# Patient Record
Sex: MALE | Race: OTHER | NOT HISPANIC OR LATINO | URBAN - METROPOLITAN AREA
[De-identification: names, ages, dates, MRNs, and addresses within clinical notes are randomized per-mention and may not be internally consistent; named-entity substitution may affect disease eponyms.]

---

## 2021-07-31 ENCOUNTER — EMERGENCY (EMERGENCY)
Facility: HOSPITAL | Age: 26
LOS: 1 days | Discharge: HOME | End: 2021-07-31
Attending: EMERGENCY MEDICINE
Payer: OTHER GOVERNMENT

## 2021-07-31 VITALS
WEIGHT: 207.9 LBS | RESPIRATION RATE: 16 BRPM | TEMPERATURE: 98 F | HEIGHT: 75 IN | SYSTOLIC BLOOD PRESSURE: 123 MMHG | OXYGEN SATURATION: 98 % | HEART RATE: 88 BPM | DIASTOLIC BLOOD PRESSURE: 71 MMHG

## 2021-07-31 PROCEDURE — 99284 EMERGENCY DEPT VISIT MOD MDM: CPT | Mod: 57

## 2021-07-31 PROCEDURE — 73140 X-RAY EXAM OF FINGER(S): CPT | Mod: 26,RT,76

## 2021-07-31 PROCEDURE — 99283 EMERGENCY DEPT VISIT LOW MDM: CPT | Mod: 25

## 2021-07-31 PROCEDURE — 73140 X-RAY EXAM OF FINGER(S): CPT

## 2021-07-31 PROCEDURE — 26770 TREAT FINGER DISLOCATION: CPT | Mod: 54

## 2021-07-31 PROCEDURE — 26770 TREAT FINGER DISLOCATION: CPT | Mod: F8

## 2021-07-31 RX ORDER — ACETAMINOPHEN 500 MG
650 TABLET ORAL ONCE
Refills: 0 | Status: COMPLETED | OUTPATIENT
Start: 2021-07-31 | End: 2021-07-31

## 2021-07-31 RX ORDER — IBUPROFEN 200 MG
600 TABLET ORAL ONCE
Refills: 0 | Status: COMPLETED | OUTPATIENT
Start: 2021-07-31 | End: 2021-07-31

## 2021-07-31 RX ADMIN — Medication 600 MILLIGRAM(S): at 21:07

## 2021-07-31 RX ADMIN — Medication 650 MILLIGRAM(S): at 21:06

## 2021-07-31 NOTE — ED PROVIDER NOTE - NSFOLLOWUPINSTRUCTIONS_ED_ALL_ED_FT
Follow up with Hand Surgery Dr. Luna in 7 days, number provided above: [phone]    Return if an worsening symptoms such as worsening pain, swelling, numbness/tingling, fever, chest pain, difficulty breathing.

## 2021-07-31 NOTE — ED PROVIDER NOTE - PROGRESS NOTE DETAILS
Post reduction XRs reviewed; reduction complete, no fractures noted.  Carson splint, d/c c hand f/u.  --[facility]

## 2021-07-31 NOTE — ED PROVIDER NOTE - CLINICAL SUMMARY MEDICAL DECISION MAKING FREE TEXT BOX
Peter Gann (MD): clinical PIP dislocation. Will check xr to evaluate for fracture and reduce appropriately.

## 2021-07-31 NOTE — ED PROVIDER NOTE - PHYSICAL EXAMINATION
Peter Gann (MD):     GEN: well appearing adult m, NAD  HENT: NCAT  RESP: CTAB   NEURO:  no focal deficits   EXTREMITIES: R 4th finger swelling and deformity at the PIP. Unable to range at POP. Mild sensory deficit to PIP diffusely. Capillary refill less than 2 seconds. Digit is warm. No crepitation. Peter Gann (MD):     GEN: well appearing adult m, NAD  HENT: NCAT  RESP: No respiratory distress  NEURO:  no focal deficits   EXTREMITIES: R 4th finger swelling and deformity at the PIP. Unable to range at POP. Mild sensory deficit to PIP diffusely. Capillary refill less than 2 seconds. Digit is warm. No crepitation.

## 2021-07-31 NOTE — ED PROVIDER NOTE - OBJECTIVE STATEMENT
25y otherwise health male, R hand dominant,  presents to the ED c/o R 4th finger pain and injury 1.5 hours PTA. pt was attempting to catch a ball and jammed his R 4th finger. + deformity. Otherwise no complaints. Pt has not taken anything for pain PTA.

## 2021-07-31 NOTE — ED PROVIDER NOTE - PATIENT PORTAL LINK FT
You can access the FollowMyHealth Patient Portal offered by [facility] by registering at the following website: [email or web address]. By joining [facility]’s FollowMyHealth portal, you will also be able to view your health information using other applications (apps) compatible with our system.

## 2021-07-31 NOTE — ED PROVIDER NOTE - NS_ ATTENDINGSCRIBEDETAILS _ED_A_ED_FT
Azeem FLORES: The scribe's documentation has been prepared under my direction and personally reviewed by me in its entirety. I confirm that the note above accurately reflects all work, treatment, procedures, and medical decision making performed by me (Dr. Gann).

## 2021-07-31 NOTE — ED ADULT NURSE NOTE - OBJECTIVE STATEMENT
25yM no PMHx or daily meds c/c R 4th digit injury/pain. ~1.5hrs ago, pt was catching a ball when it landed on his R hand fourth finger which hyperextended. Swelling and deformity noted to 2nd knuckle. Mild decreased sensation distal to swelling, no numbness/tingling reported. Pt reports 3/10 pain at this time. +radial pulses in UE BL. Pt unable to bend knuckle at this time. Pt aware of plan to await xray.

## 2021-07-31 NOTE — ED PROVIDER NOTE - CARE PROVIDER_API CALL
Pau Luna)  Plastic Surgery; Surgery  [address], [address]  [place] [address]  Phone: [phone]  Fax: [phone]  Follow Up Time:

## 2021-07-31 NOTE — ED PROCEDURE NOTE - PROCEDURE ADDITIONAL DETAILS
R 4th PIP reduced c traction/counter traction and distraction.  Joint reduced easily, no complications, pt tolerated well.  Improved sensation to finger post reduction.  CR <2 sec.  Rpt XRs performed.  --[facility]

## 2021-09-07 ENCOUNTER — EMERGENCY (EMERGENCY)
Facility: HOSPITAL | Age: 26
LOS: 0 days | Discharge: HOME | End: 2021-09-07
Payer: OTHER GOVERNMENT

## 2021-09-07 VITALS
HEIGHT: 75 IN | HEART RATE: 71 BPM | TEMPERATURE: 98 F | OXYGEN SATURATION: 99 % | DIASTOLIC BLOOD PRESSURE: 83 MMHG | WEIGHT: 207.9 LBS | RESPIRATION RATE: 19 BRPM | SYSTOLIC BLOOD PRESSURE: 132 MMHG

## 2021-09-07 DIAGNOSIS — S60.949A UNSPECIFIED SUPERFICIAL INJURY OF UNSPECIFIED FINGER, INITIAL ENCOUNTER: ICD-10-CM
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
## 2021-09-07 PROCEDURE — 99282 EMERGENCY DEPT VISIT SF MDM: CPT

## 2021-09-07 NOTE — ED PROVIDER NOTE - NS ED ROS FT
Constitutional: (-) Fever, (-) Chills  Skin: (-) Rashes  CV: (-) Chest pain, (-) Palpitations  Resp: (-) Cough, (-) Shortness of breath, (-) Dyspnea on Exertion  GI: (-) Abdominal pain, (-) Nausea, (-) Vomiting, (-) Diarrhea  : (-) Dysuria  MSK: (-) Weakness, (-) Myalgias  Neuro: (-) Headache

## 2021-09-07 NOTE — ED PROVIDER NOTE - CLINICAL SUMMARY MEDICAL DECISION MAKING FREE TEXT BOX
25y Male with no sig PMHx presents to the ER for finger injury. Dec ROM of PIP, full ROM DIP. Cap refill < 2secs, neurovascularly intact. Will discharge home with hand follow up.

## 2021-09-07 NOTE — ED PROVIDER NOTE - NSFOLLOWUPINSTRUCTIONS_ED_ALL_ED_FT
Today you were seen in the ER for hand pain.     Please see below for information on hand doctors. Please call and schedule an appointment as soon as possible.     Continue all previously prescribed medications as directed unless otherwise instructed.     Follow up with your primary care physician in 48-72 hours- bring copies of your results.     Return to the ER for worsening or persistent symptoms, and/or ANY NEW OR CONCERNING SYMPTOMS including but not limited to fever, chills, redness, swelling, skin color changes or pain.

## 2021-09-07 NOTE — ED PROVIDER NOTE - PATIENT PORTAL LINK FT
You can access the FollowMyHealth Patient Portal offered by [facility] by registering at the following website: [email or web address]. By joining [employer]’s FollowMyHealth portal, you will also be able to view your health information using other applications (apps) compatible with our system.

## 2021-09-07 NOTE — ED PROVIDER NOTE - CARE PROVIDER_API CALL
Pau Luna)  Plastic Surgery; Surgery  [address], [address]  [place] [address]  Phone: [phone]  Fax: [phone]  Follow Up Time:     Harvey Childers)  Plastic Surgery  [address], [address]  [place], [place] [id number]  Phone: [phone]  Fax: [phone]  Follow Up Time:

## 2021-09-07 NOTE — ED ADULT NURSE NOTE - OBJECTIVE STATEMENT
Pt received with complaints of mild pain and joint deformities to R fourth finger since the month of July.

## 2022-05-03 ENCOUNTER — EMERGENCY (EMERGENCY)
Facility: HOSPITAL | Age: 27
LOS: 0 days | Discharge: HOME | End: 2022-05-03
Attending: STUDENT IN AN ORGANIZED HEALTH CARE EDUCATION/TRAINING PROGRAM
Payer: COMMERCIAL

## 2022-05-03 VITALS
SYSTOLIC BLOOD PRESSURE: 117 MMHG | HEART RATE: 60 BPM | OXYGEN SATURATION: 100 % | DIASTOLIC BLOOD PRESSURE: 65 MMHG | RESPIRATION RATE: 17 BRPM | TEMPERATURE: 98 F

## 2022-05-03 VITALS
HEIGHT: 75 IN | SYSTOLIC BLOOD PRESSURE: 131 MMHG | RESPIRATION RATE: 18 BRPM | WEIGHT: 212.08 LBS | DIASTOLIC BLOOD PRESSURE: 62 MMHG | HEART RATE: 66 BPM | OXYGEN SATURATION: 100 % | TEMPERATURE: 99 F
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
## 2022-05-03 DIAGNOSIS — M25.552 PAIN IN LEFT HIP: ICD-10-CM
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
## 2022-05-03 PROCEDURE — 99284 EMERGENCY DEPT VISIT MOD MDM: CPT

## 2022-05-03 PROCEDURE — 73502 X-RAY EXAM HIP UNI 2-3 VIEWS: CPT | Mod: 26,LT

## 2022-05-03 RX ORDER — ACETAMINOPHEN 500 MG
650 TABLET ORAL ONCE
Refills: 0 | Status: COMPLETED | OUTPATIENT
Start: 2022-05-03 | End: 2022-05-03

## 2022-05-03 RX ORDER — KETOROLAC TROMETHAMINE 30 MG/ML
15 SYRINGE (ML) INJECTION ONCE
Refills: 0 | Status: DISCONTINUED | OUTPATIENT
Start: 2022-05-03 | End: 2022-05-03

## 2022-05-03 RX ADMIN — Medication 15 MILLIGRAM(S): at 21:18

## 2022-05-03 RX ADMIN — Medication 650 MILLIGRAM(S): at 21:13

## 2022-05-03 RX ADMIN — Medication 15 MILLIGRAM(S): at 21:13

## 2022-05-03 RX ADMIN — Medication 650 MILLIGRAM(S): at 21:18

## 2022-05-03 NOTE — ED PROVIDER NOTE - OBJECTIVE STATEMENT
25 yo male w/ no PMH, presents to the ED c/o left hip pain s/p fall x2 hours ago. Pt was playing cricket and tried to catch the ball and landed incorrectly on his left leg, causing it to internally rotate. Pt now has left hip pain, non-radiating, and unable to ambulate normally, but able to take a few steps w/ pain. Pt denies numbness, head trauma, or LOC.

## 2022-05-03 NOTE — ED PROVIDER NOTE - MUSCULOSKELETAL, MLM
Left lateral hip tenderness to palpation. Pain with flexion of hip. No pain with internal /external rotation of hip. No deformities.

## 2022-05-03 NOTE — ED PROVIDER NOTE - NSFOLLOWUPINSTRUCTIONS_ED_ALL_ED_FT
Activities as tolerated. Please encourage good oral and fluid intake. For pain, please take Motrin 400mg every 4 hours as needed, or Tylenol 650mg every 6 hours as needed.    Please see your primary care doctor within 24-48 hours for further management of your symptoms.  Please follow up with Orthopedics in one week for further evaluation of your symptoms.    Please seek emergent medical management if you have any worsening signs or symptoms.

## 2022-05-03 NOTE — ED PROVIDER NOTE - PATIENT PORTAL LINK FT
You can access the FollowMyHealth Patient Portal offered by [facility] by registering at the following website: [email or web address]. By joining [employer]’s FollowMyHealth portal, you will also be able to view your health information using other applications (apps) compatible with our system.

## 2022-05-03 NOTE — ED ADULT TRIAGE NOTE - CHIEF COMPLAINT QUOTE
Pt present to ed with c/o left hip pain, stated was playing and fell to the ground with  hip got twisted, feels dislocated. Pt  unable to bear weight to leg leg in triage. Pt denies any PMH

## 2022-05-03 NOTE — ED PROVIDER NOTE - CLINICAL SUMMARY MEDICAL DECISION MAKING FREE TEXT BOX
27 yo male w/ no PMH, presents w/ left hip pain. Suggestive of musculoskeletal strain vs sprain. Less likely dislocation or fracture. Will obtain x-ray, pain control, and reassess.

## 2022-05-03 NOTE — ED PROVIDER NOTE - PROGRESS NOTE DETAILS
KENTRELL Wilder MD  xray neg for fx or dislocation. pt reassessed, improved sx after meds, good ROM and ambulating well. will dc with supportive care and f/u with ortho and return precautions.

## 2022-05-03 NOTE — ED PROVIDER NOTE - CARE PROVIDER_API CALL
Lyndon Steve (DO)  Orthopaedic Surgery  [address] [place] [address]  Phone: [phone]  Fax: [phone]  Follow Up Time: Urgent

## 2022-05-04 PROBLEM — Z78.9 OTHER SPECIFIED HEALTH STATUS: Chronic | Status: ACTIVE | Noted: 2021-09-07

## 2022-06-09 PROBLEM — Z00.00 ENCOUNTER FOR PREVENTIVE HEALTH EXAMINATION: Status: ACTIVE | Noted: 2022-06-09

## 2022-06-10 ENCOUNTER — APPOINTMENT (OUTPATIENT)
Dept: ORTHOPEDIC SURGERY | Facility: CLINIC | Age: 27
End: 2022-06-10

## 2022-07-08 ENCOUNTER — APPOINTMENT (OUTPATIENT)
Dept: ORTHOPEDIC SURGERY | Facility: CLINIC | Age: 27
End: 2022-07-08

## 2022-07-25 ENCOUNTER — APPOINTMENT (OUTPATIENT)
Dept: ORTHOPEDIC SURGERY | Facility: CLINIC | Age: 27
End: 2022-07-25

## 2022-07-25 VITALS — WEIGHT: 202 LBS | BODY MASS INDEX: 25.12 KG/M2 | HEIGHT: 75 IN

## 2022-07-25 DIAGNOSIS — M75.42 IMPINGEMENT SYNDROME OF LEFT SHOULDER: ICD-10-CM

## 2022-07-25 DIAGNOSIS — Z78.9 OTHER SPECIFIED HEALTH STATUS: ICD-10-CM

## 2022-07-25 DIAGNOSIS — M75.82 OTHER SHOULDER LESIONS, LEFT SHOULDER: ICD-10-CM

## 2022-07-25 PROCEDURE — 99072 ADDL SUPL MATRL&STAF TM PHE: CPT

## 2022-07-25 PROCEDURE — 73010 X-RAY EXAM OF SHOULDER BLADE: CPT | Mod: LT

## 2022-07-25 PROCEDURE — 73030 X-RAY EXAM OF SHOULDER: CPT | Mod: LT

## 2022-07-25 PROCEDURE — 99204 OFFICE O/P NEW MOD 45 MIN: CPT

## 2022-07-25 NOTE — IMAGING
[de-identified] : \par LEFT SHOULDER\par Inspection: No swelling. \par Palpation: Tenderness is noted at the bicipital groove, anterior and lateral. \par Range of motion: There is pain with range of motion.\par , ER 55, @90ER 90, @90IR 30\par Strength: There is pain and discomfort with strength testing.\par Forward Flexion 4/5. Abduction 4/5.  External Rotation 5-/5 and Internal Rotation 5/5 \par Neurological testings: motor and sensor intact distally.\par Ligament Stability and Special Tests: \par There is positive arc of pain. \par Shoulder apprehension: neg\par Shoulder relocation: neg\par Morelos’s test: pos\par Biceps Active test: neg\par Alfred Labral Shear: neg\par Impingement testing: pos\par Prashant testing: pos\par Whipple: pos\par Cross Body Adduction: neg\par \par

## 2022-07-25 NOTE — ASSESSMENT
[FreeTextEntry1] : Imaging was reviewed and independently interpreted\par mri left shoulder (indida) 6/20/22 - RTC tendinopathy, no tears\par \par  Left X-Ray Examination of the SHOULDER (2 views):  No fractures, subluxations or dislocations. \par X-Ray Examination of the SCAPULA 1 or 2 views shows: No significant abnormalities.  Acromial spur. \par \par - We discussed their diagnosis and treatment options at length. \par - We will continue conservative treatment with a course of PT, icing, and anti-inflammatory medication.\par - The patient was provided with a prescription to work on scapular strengthening and rotator cuff strengthening.\par - The patient was advised to let pain guide the gradual advancement of activities. \par - Patient was given a prescription for an anti-inflammatory medication.  They will take it for the next week and then on an as needed basis, as long as there are no medical contra-indications.  Patient is counseled on possible GI, renal, and cardiovascular side effects.\par - Follow up as needed in 6 weeks to re-evaluate progress with therapy\par \par  Niacinamide Pregnancy And Lactation Text: These medications are considered safe during pregnancy.

## 2022-08-10 ENCOUNTER — APPOINTMENT (OUTPATIENT)
Dept: ORTHOPEDIC SURGERY | Facility: CLINIC | Age: 27
End: 2022-08-10

## 2022-08-25 ENCOUNTER — APPOINTMENT (OUTPATIENT)
Dept: ORTHOPEDIC SURGERY | Facility: CLINIC | Age: 27
End: 2022-08-25

## 2022-08-25 VITALS — BODY MASS INDEX: 25.12 KG/M2 | HEIGHT: 75 IN | WEIGHT: 202 LBS

## 2022-08-25 DIAGNOSIS — M25.552 PAIN IN LEFT HIP: ICD-10-CM

## 2022-08-25 PROCEDURE — 99204 OFFICE O/P NEW MOD 45 MIN: CPT

## 2022-08-25 PROCEDURE — 73503 X-RAY EXAM HIP UNI 4/> VIEWS: CPT | Mod: LT

## 2022-08-25 RX ORDER — MELOXICAM 15 MG/1
15 TABLET ORAL
Qty: 30 | Refills: 2 | Status: ACTIVE | COMMUNITY
Start: 2022-08-25 | End: 1900-01-01

## 2022-08-25 NOTE — ASSESSMENT
[FreeTextEntry1] : 26 year M with left hip abductor tendonitis \par - physical therapy, NSAIDs,\par - Return in 6 weeks for follow up prn

## 2022-08-25 NOTE — HISTORY OF PRESENT ILLNESS
[Sudden] : sudden [6] : 6 [2] : 2 [Dull/Aching] : dull/aching [Intermittent] : intermittent [Rest] : rest [Ice] : ice [Standing] : standing [Walking] : walking [de-identified] : Mr. CHARISSA KING is a 26 year male that comes in today with a chief complaint - \par Pt was playing with  a ball in may when he tried to catch  the  ball an fell down and hurt  the left hip. pt feels discomfort in the left hip while moving around. Hip twisted in-wards \par \par pain with activities/running, pain lateral hip. [] : no

## 2022-08-25 NOTE — IMAGING
[de-identified] : Constitutional: well developed and well nourished, able to communicate\par Cardiovascular: Peripheral vascular exam is grossly normal\par Neurologic: Alert and oriented, no acute distress.\par Skin: normal skin with no ulcers, rashes, or lesions\par Pulmonary: No respiratory distress, breathing comfortably on room air\par Lymphatics: No obvious lymphadenopathy or lymphedema in areas examined\par \par LOWER EXTREMITY/LEFT HIP EXAM\par Standing pelvic alignment: Symmetric with no Trendelenburg\par Atrophy: none\par Ecchymosis/swelling: none\par + abdcutor TTP Left\par \par Range of Motion\par Hip: Flexion/extension/ER/IR     / EX 20/ ER 45/ IR 20 / AB 60 /AD 20\par Impingement with flexion adduction and internal rotation: negative\par Contracture: none\par Snapping hip: negative\par Greater trochanter: no tenderness\par \par Neurovascular\par Distal extremities: warm to touch\par Sensation to light touch: intact\par Muscle strength: 5/5\par \par IMAGING:\par 08/25/2022 Xrays of the Left hip 3v were taken demonstrating no signs of fractures, dislocations,or significant arthritis.

## 2022-11-02 ENCOUNTER — APPOINTMENT (OUTPATIENT)
Dept: ORTHOPEDIC SURGERY | Facility: CLINIC | Age: 27
End: 2022-11-02

## 2022-12-09 ENCOUNTER — APPOINTMENT (OUTPATIENT)
Dept: ORTHOPEDIC SURGERY | Facility: CLINIC | Age: 27
End: 2022-12-09

## 2022-12-22 ENCOUNTER — APPOINTMENT (OUTPATIENT)
Dept: ORTHOPEDIC SURGERY | Facility: CLINIC | Age: 27
End: 2022-12-22

## 2022-12-22 VITALS — WEIGHT: 202 LBS | HEIGHT: 75 IN | BODY MASS INDEX: 25.12 KG/M2

## 2022-12-22 DIAGNOSIS — M76.892 OTHER SPECIFIED ENTHESOPATHIES OF LEFT LOWER LIMB, EXCLUDING FOOT: ICD-10-CM

## 2022-12-22 PROCEDURE — 99213 OFFICE O/P EST LOW 20 MIN: CPT

## 2022-12-22 NOTE — IMAGING
[de-identified] : Constitutional: well developed and well nourished, able to communicate\par Cardiovascular: Peripheral vascular exam is grossly normal\par Neurologic: Alert and oriented, no acute distress.\par Skin: normal skin with no ulcers, rashes, or lesions\par Pulmonary: No respiratory distress, breathing comfortably on room air\par Lymphatics: No obvious lymphadenopathy or lymphedema in areas examined\par \par LOWER EXTREMITY/LEFT HIP EXAM\par Standing pelvic alignment: Symmetric with no Trendelenburg\par Atrophy: none\par Ecchymosis/swelling: none\par + abdcutor TTP Left\par \par Range of Motion\par Hip: Flexion/extension/ER/IR     / EX 20/ ER 45/ IR 20 / AB 60 /AD 20\par Impingement with flexion adduction and internal rotation: negative\par Contracture: none\par Snapping hip: negative\par Greater trochanter: no tenderness\par \par Neurovascular\par Distal extremities: warm to touch\par Sensation to light touch: intact\par Muscle strength: 5/5\par \par IMAGING:\par 08/25/2022 Xrays of the Left hip 3v were taken demonstrating no signs of fractures, dislocations,or significant arthritis.

## 2022-12-22 NOTE — ASSESSMENT
[FreeTextEntry1] : 26 year M with left hip abductor tendonitis \par - physical therapy, NSAIDs,\par - Return in 6 weeks for follow up prn\par \par 12/22/22: L hip pain no relieve with conservative therapy. MRI L hip to r/o tear. Follow up after MRI.

## 2022-12-22 NOTE — HISTORY OF PRESENT ILLNESS
[Sudden] : sudden [6] : 6 [2] : 2 [Dull/Aching] : dull/aching [Intermittent] : intermittent [Rest] : rest [Ice] : ice [Standing] : standing [Walking] : walking [de-identified] : Mr. CHARISSA KING is a 26 year male that comes in today with a chief complaint - \par Pt was playing with  a ball in may when he tried to catch  the  ball an fell down and hurt  the left hip. pt feels discomfort in the left hip while moving around. Hip twisted in-wards \par \par pain with activities/running, pain lateral hip.\par \par 12/22/2022 Mr. CHARISSA KING M  here for eval of the left hip. Patient has pain main when he sits. Patient states he still feels stiffness/ swelling. States completed one month of PT with no relieve but relieve with meloxicam. \par  [] : no [FreeTextEntry1] : left hip

## 2023-01-04 ENCOUNTER — APPOINTMENT (OUTPATIENT)
Dept: ORTHOPEDIC SURGERY | Facility: CLINIC | Age: 28
End: 2023-01-04
Payer: COMMERCIAL

## 2023-01-04 VITALS — WEIGHT: 218 LBS | HEIGHT: 75 IN | BODY MASS INDEX: 27.1 KG/M2

## 2023-01-04 DIAGNOSIS — Z78.9 OTHER SPECIFIED HEALTH STATUS: ICD-10-CM

## 2023-01-04 DIAGNOSIS — M94.252 CHONDROMALACIA, LEFT HIP: ICD-10-CM

## 2023-01-04 DIAGNOSIS — Q65.89 OTHER SPECIFIED CONGENITAL DEFORMITIES OF HIP: ICD-10-CM

## 2023-01-04 DIAGNOSIS — S73.192A OTHER SPRAIN OF LEFT HIP, INITIAL ENCOUNTER: ICD-10-CM

## 2023-01-04 PROCEDURE — 99214 OFFICE O/P EST MOD 30 MIN: CPT

## 2023-01-04 NOTE — IMAGING
[de-identified] : \par ----------------------------------------------------------------------------\par \par Left hip exam: \par \par Inspection: no deformity, no swelling, no gross limb length discrepancy\par ROM: \par    Flexion: 100\par    ER: 50\par    IR: 10\par Tenderness: \par    +Groin tenderness\par    +Greater trochanteric tenderness\par    (-) Buttock tenderness\par    (-) IT Band tenderness\par    (-) Anterior thigh tenderness\par    (-) ASIS tenderness\par    (-) Ischial tuberosity\par    (-) Hamstring muscle tenderness\par Additional tests: \par    +FADIR\par    (-) AUGIE\par    (-) Resisted hip flexion pain\par    (-) Apprehension (external rotation/extension)\par    (-) Posterior pain with forced hip flexion and knee extension\par    (-) Tight hamstrings\par    (-) Log roll\par    (-) Axial load\par Strength: 5/5 IP/Q/H/TA/GS/EHL\par Neuro: In tact to light touch throughout, DTR's wnl\par Vascularity: Extremity warm and well perfused\par Gait: normal.\par \par  [Left] : left hip with pelvis [All Views] : anteroposterior, lateral [Mild arthritis (Tonnis Grade 1)] : Mild arthritis (Tonnis Grade 1) [Femoral CAM lesion with Alpha angle greater than 50] : Femoral CAM lesion with Alpha angle greater than 50 [FreeTextEntry9] : ACEA ~ 21

## 2023-01-04 NOTE — DATA REVIEWED
[MRI] : MRI [Left] : left [Hip] : hip [I independently reviewed and interpreted images and report] : I independently reviewed and interpreted images and report [FreeTextEntry1] : labral tear, early djd, cartilage injury. bone spurs femoral head.

## 2023-01-04 NOTE — HISTORY OF PRESENT ILLNESS
[0] : 0 [5] : 5 [Dull/Aching] : dull/aching [Throbbing] : throbbing [Constant] : constant [Sitting] : sitting [de-identified] : 1/4/23: 26yo M with left hip pain since 05/2022 after a fall. He had done PT and Meloxicam with mild relief due to persistent pain. pain with sitting and activity. no pain with walking. pain in groin and lateral hip.  patient notes he has done extensive impact based activity at work since starting in 2019\par \par occupatin:  [] : no [FreeTextEntry1] : LT Hip [FreeTextEntry5] : Dawson is a 27 year who presents with pain in the left hip. States they were playing cricket. Stated they fell and there left leg twisted which caused pain to the left hip. Stated they went to urgent care and they took x-ray and gave him pain killers. Stated the x-ray came back normal. Stated they went to a orthopedic surgeon and they took z-ray and it was normal. Stated they did PT and didn't’t feel any improvement. Stated they did an MRI and it was an labrum tear.

## 2023-01-04 NOTE — DISCUSSION/SUMMARY
[de-identified] : borderline dysplasia. ice, rest, PT. IAC left hip \par discussed surgical and nonsurgical managmenet\par \par ----------------------------------------------------------------------------\par \par The patient was advised of the diagnosis.  The natural history of the pathology was explained in full. All questions were answered.  The risks and benefits of conservative and interventional treatment alternatives were explained to the patient\par \par \par ----------------------------------------------------------------------------\par \par MRI(s) and/or other advanced imaging studies (CT/etc) were reviewed with the patient. Implications of the study(ies) together with the patient's clinical picture were discussed to formulate a working diagnosis and management options were detailed.\par

## 2023-01-05 ENCOUNTER — APPOINTMENT (OUTPATIENT)
Dept: ORTHOPEDIC SURGERY | Facility: CLINIC | Age: 28
End: 2023-01-05

## 2023-01-09 ENCOUNTER — RESULT REVIEW (OUTPATIENT)
Age: 28
End: 2023-01-09

## 2023-08-24 NOTE — ED PROVIDER NOTE - OBJECTIVE STATEMENT
4 or more times a week 25y Male with no sig PMHx presents to the ER for finger injury. Patient reports right finger dislocation at the end of July for which he was seen at [facility] for and had a reduction performed. Patient was advised to follow up with the hand specialist but states he missed his appointment and lost the phone number to reschedule. Denies new injury, pain, numbness, tingling or skin color changes.

## 2024-01-05 ENCOUNTER — NON-APPOINTMENT (OUTPATIENT)
Age: 29
End: 2024-01-05

## 2024-01-06 ENCOUNTER — RESULT REVIEW (OUTPATIENT)
Age: 29
End: 2024-01-06

## 2024-01-12 ENCOUNTER — APPOINTMENT (OUTPATIENT)
Age: 29
End: 2024-01-12
Payer: COMMERCIAL

## 2024-01-12 ENCOUNTER — NON-APPOINTMENT (OUTPATIENT)
Age: 29
End: 2024-01-12

## 2024-01-12 VITALS — WEIGHT: 205 LBS | HEIGHT: 75 IN | BODY MASS INDEX: 25.49 KG/M2

## 2024-01-12 DIAGNOSIS — M25.532 PAIN IN LEFT WRIST: ICD-10-CM

## 2024-01-12 DIAGNOSIS — S63.502A UNSPECIFIED SPRAIN OF LEFT WRIST, INITIAL ENCOUNTER: ICD-10-CM

## 2024-01-12 PROCEDURE — 73110 X-RAY EXAM OF WRIST: CPT | Mod: LT

## 2024-01-12 PROCEDURE — 99214 OFFICE O/P EST MOD 30 MIN: CPT

## 2024-01-12 NOTE — PHYSICAL EXAM
[de-identified] : - Constitutional: This is a male in no obvious distress.  - Psych: Patient is alert and oriented to person, place and time.  Patient has a normal mood and affect. - Cardiovascular: Normal pulses throughout the upper extremities.  No significant varicosities are noted in the upper extremities. - Neuro: Strength and sensation are intact throughout the upper extremities.  Patient has normal coordination. - Respiratory:  Patient exhibits no evidence of shortness of breath or difficulty breathing. - Skin: No rashes, lesions, or other abnormalities are noted in the upper extremities. ---  Examination of his left wrist and hand demonstrates no obvious swelling.  There is mild tenderness along the scapholunate ligament.  He is more tender along the triquetrum dorsally.  There is no swelling or tenderness along the TFCC ligament.  There is a negative Barrera sign.  He has full flexion and extension of the digits.  He is neurovascularly intact distally. [de-identified] :  PA, lateral, and PA  radiographs of the left wrist demonstrate no fractures, dislocations or instability patterns.

## 2024-01-12 NOTE — END OF VISIT
[FreeTextEntry3] : This note was written by Joey Yepez on 01/12/2024 acting solely as a scribe for Dr. Fan Michaud.   All medical record entries made by the Scribe were at my, Dr. Fan Michaud, direction and personally dictated by me on 01/12/2024. I have personally reviewed the chart and agree that the record accurately reflects my personal performance of the history, physical exam, assessment and plan.

## 2024-01-12 NOTE — ADDENDUM
[FreeTextEntry1] :  I, Joey Yepez, acted solely as a scribe for Dr. Michaud on this date on 01/12/2024.

## 2024-01-12 NOTE — HISTORY OF PRESENT ILLNESS
[Right] : right hand dominant [FreeTextEntry1] :  He comes in today for evaluation of left wrist pain while playing volleyball 3 months ago. He went to the urgent care last week and had x-rays done. He denies any numbness or tingling and rates his pain as a 7/10.

## 2024-01-12 NOTE — DISCUSSION/SUMMARY
[FreeTextEntry1] :  He has findings consistent with persistent left wrist pain after an injury approximately 3 months ago that has not improved with management.   I had a discussion with the patient regarding today's visit, the prognosis of this diagnosis, and treatment recommendations and options.  At this time, I recommended an MRI to evaluate left wrist sprain. He will follow up after his MRI to review the results and discuss treatment recommendations.   The patient has agreed to the above plan of management and has expressed full understanding. All questions were fully answered to the patient's satisfaction.   My cumulative time spent on this visit included: Preparation for the visit, review of the medical records, review of pertinent diagnostic studies, examination and counseling of the patient on the above diagnosis, treatment plan and prognosis, orders of diagnostic tests, medication and/or appropriate procedures and documentation in the medical records of today's visit.

## 2024-01-24 RX ORDER — MELOXICAM 15 MG/1
15 TABLET ORAL
Qty: 21 | Refills: 1 | Status: ACTIVE | COMMUNITY
Start: 2024-01-24 | End: 1900-01-01

## 2024-04-17 ENCOUNTER — NON-APPOINTMENT (OUTPATIENT)
Age: 29
End: 2024-04-17

## 2024-12-20 ENCOUNTER — NON-APPOINTMENT (OUTPATIENT)
Age: 29
End: 2024-12-20